# Patient Record
(demographics unavailable — no encounter records)

---

## 2025-01-24 NOTE — ASSESSMENT
[FreeTextEntry1] : 87F with PMH of hypothyroidism, migraines, JUANITA, pulmonary nodule, HTN, HLD, Afib (currently off Eliquis), HFpEF, and severe MR 2/2 flail P2 leaflet with 2 ADHF admission in December in the setting of severe MR now s/p mTEER (XTW x 1) via RFV on 12/31/24, who presents for her post hospital discharge follow up.   The patient is clinically stable, NYHA Class II. The patient reports to continue to feel well, no concerning symptoms of volume overload, and access sites are healing appropriately. Saw her cardiologist yesterday who recommends resuming her Eliquis, awaiting delivery of the medication. The ECHO done prior to discharge was reviewed. Recommend continue close BP monitoring, asked to purchase a scale to monitor her daily weight and to reach out to her cardiologist should she gain 2lbs in 1 day or 5 lbs. in 1 week to prevent hospital readmission. Maintain low sodium (2G) Na diet. Reminded patient to avoid routine dental visit/procedure for the next 3 months and that IE Abx ppx prior to any dental procedure is needed indefinitely moving forward. The patient will return in three weeks with ECHO for her 1-month post mTEER follow up. All questions were answered.

## 2025-01-24 NOTE — PLAN
[TextEntry] : - continue current medication regimen, agree with restarting Eliquis.  - continue close follow up with Dr. Valle for ongoing cardiology care and GDMT optimization.  - IE Abx ppx 30-60 min prior to dental procedure.  - maintain 2G Na restricted diet, daily weight, and symptoms to reports were provided.  - return to [facility] in 3-week with TTE and an outpatient visit for her 1-month post mTEER follow up, or as needed.

## 2025-01-24 NOTE — HISTORY OF PRESENT ILLNESS
[FreeTextEntry1] : Cardiologist: Dr. Valle from [facility]  87F with PMH of hypothyroidism, migraines, JUANITA, pulmonary nodule, HTN, HLD, Afib (currently off Eliquis), HFpEF, and severe MR 2/2 flail P2 leaflet with 2 ADHF admission in December in the setting of severe MR now s/p mTEER (XTW x 1) via RFV on 12/31/24, who presents for her post hospital discharge follow up.   Discharge Summary [facility] Admission from 12/17/24-1/6/2025 Had hospitalization 12/9-12/12/24 at [facility] for AHRF 2/2 to acute on chronic CHF exacerbation and Afib w/ RVR, then represented to [facility] 12/17/24 with worsening ARROYO. Patient transferred to [facility] for HFpEF exacerbation with severe MR who underwent Mitraclip on 12/31/24 with Dr. Ang. OR course significant for trace blood in the mouth after TONI probe/ET tube removed. No obvious signs of trauma. Arrived to  under ICU care, stable in afib, rate controlled. POD 1 Overnight with expanding hematoma sublingual. ENT consulted and recommended decadron to help reduce size. POD 3 speech/swallow OK to advance diet as patient tolerates. Patient refused ASA 2/2 to "stomach burning", understands she is at increased risk for blood clot. POD 4: started on medrol dosepak, per ENT recommendations, cleared to resume Eliquis on 01/2/25. POD 6 Per Dr. Ang, continue to hold Eliquis until outpatient follow-up. Home health reinstated for today. Cleared for discharge per Dr. Ang. At time of discharge, she is hemodynamically stable, voiding well, passing gas, ambulating in the hallway, and pain controlled under oral regimen.   Discharge TTE 12/31/24 CONCLUSIONS: 1. Technically difficult study. 2. Normal left ventricular size and systolic function. 3. Moderate symmetric left ventricular hypertrophy. 4. Normal right ventricular size and systolic function. 5. Mild aortic stenosis. Mild aortic regurgitation. 6. One XTW MitraClip visualized in stable position at A2-P2 position. Mean transvalvular gradient is 4.00 mmHg at a heart rate of 93 bpm. 7. Mild-to-moderate eccentric mitral regurgitation predominantly lateral to MitraClip at blood pressure of 140/87. 8. Mild tricuspid regurgitation. 9. Pulmonary hypertension present, pulmonary artery systolic pressure is 41 mmHg. 10. No pericardial effusion. 11. Compared to the previous TTE performed on 12/19/2024, patient is s/p mTEER with improvement in severity of mitral regurgitation.   Discharge Labs 1/4/25 WBC 8.07 H/H 12/37.9 Plt 262 BUN/Cr 20/0.77   Discharge Medications: Atorvastatin 10mg daily, digoxin 125mcg daily, Entresto 24-26mg BID, Lasix 20mg daily, Jardiance 10 mg daily, levothyroxine 50mcg daily, medrol dose sanford, Toprol XL 200mg daily, MVI and Pantoprazole.    Home Weight Trend: Home BP trend: SBP  100-110s   Upcoming follow up appointments: Dr. Valle from [facility] - saw yesterday (1/23/25), who recommended resuming her Eliquis.   Today, patient reports feeling better, energy is better overall - feels tired today because she was out of the house for her doctor's appointment for 7hrs yesterday. She is more stable on her feet, walks around the house without shortness of breath, access sites are healing well. Completed her medrol dosepak, no oral pain or residual edema, eating and speaking well. Denies any recent chest pain, shortness of breath, palpitations, lightheadedness/dizziness or syncope, orthopnea, PND, LE edema, abdominal bloating, fever, chills, night sweats, or dysuria. She lives with a 24hr A.

## 2025-01-24 NOTE — HISTORY OF PRESENT ILLNESS
[FreeTextEntry1] : Cardiologist: Dr. Valle from [facility]  87F with PMH of hypothyroidism, migraines, JUANITA, pulmonary nodule, HTN, HLD, Afib (currently off Eliquis), HFpEF, and severe MR 2/2 flail P2 leaflet with 2 ADHF admission in December in the setting of severe MR now s/p mTEER (XTW x 1) via RFV on 12/31/24, who presents for her post hospital discharge follow up.   Discharge Summary [facility] Admission from 12/17/24-1/6/2025 Had hospitalization 12/9-12/12/24 at [facility] for AHRF 2/2 to acute on chronic CHF exacerbation and Afib w/ RVR, then represented to [facility] 12/17/24 with worsening ARROYO. Patient transferred to [facility] for HFpEF exacerbation with severe MR who underwent Mitraclip on 12/31/24 with Dr. Ang. OR course significant for trace blood in the mouth after TONI probe/ET tube removed. No obvious signs of trauma. Arrived to  under ICU care, stable in afib, rate controlled. POD 1 Overnight with expanding hematoma sublingual. ENT consulted and recommended decadron to help reduce size. POD 3 speech/swallow OK to advance diet as patient tolerates. Patient refused ASA 2/2 to "stomach burning", understands she is at increased risk for blood clot. POD 4: started on medrol dosepak, per ENT recommendations, cleared to resume Eliquis on 01/2/25. POD 6 Per Dr. Ang, continue to hold Eliquis until outpatient follow-up. Home health reinstated for today. Cleared for discharge per Dr. Ang. At time of discharge, she is hemodynamically stable, voiding well, passing gas, ambulating in the hallway, and pain controlled under oral regimen.   Discharge TTE 12/31/24 CONCLUSIONS: 1. Technically difficult study. 2. Normal left ventricular size and systolic function. 3. Moderate symmetric left ventricular hypertrophy. 4. Normal right ventricular size and systolic function. 5. Mild aortic stenosis. Mild aortic regurgitation. 6. One XTW MitraClip visualized in stable position at A2-P2 position. Mean transvalvular gradient is 4.00 mmHg at a heart rate of 93 bpm. 7. Mild-to-moderate eccentric mitral regurgitation predominantly lateral to MitraClip at blood pressure of 140/87. 8. Mild tricuspid regurgitation. 9. Pulmonary hypertension present, pulmonary artery systolic pressure is 41 mmHg. 10. No pericardial effusion. 11. Compared to the previous TTE performed on 12/19/2024, patient is s/p mTEER with improvement in severity of mitral regurgitation.   Discharge Labs 1/4/25 WBC 8.07 H/H 12/37.9 Plt 262 BUN/Cr 20/0.77   Discharge Medications: Atorvastatin 10mg daily, digoxin 125mcg daily, Entresto 24-26mg BID, Lasix 20mg daily, Jardiance 10 mg daily, levothyroxine 50mcg daily, medrol dose sanford, Toprol XL 200mg daily, MVI and Pantoprazole.    Home Weight Trend: Home BP trend: SBP  100-110s   Upcoming follow up appointments: Dr. Valle from [facility] - saw yesterday (1/23/25), who recommended resuming her Eliquis.   Today, patient reports feeling better, energy is better overall - feels tired today because she was out of the house for her doctor's appointment for 7hrs yesterday. She is more stable on her feet, walks around the house without shortness of breath, access sites are healing well. Completed her medrol dosepak, no oral pain or residual edema, eating and speaking well. Denies any recent chest pain, shortness of breath, palpitations, lightheadedness/dizziness or syncope, orthopnea, PND, LE edema, abdominal bloating, fever, chills, night sweats, or dysuria. She lives with a 24hr A.

## 2025-03-04 NOTE — HISTORY OF PRESENT ILLNESS
[FreeTextEntry1] : Cardiologist: Dr. Valle from [facility]  87F with PMH of hypothyroidism, migraines, JUANITA, pulmonary nodule, HTN, HLD, Afib (currently off Eliquis), HFpEF, and severe MR 2/2 flail P2 leaflet with 2 ADHF admission in December in the setting of severe MR now s/p mTEER (XTW x 1) via RFV on 12/31/24, who presents for her one month follow up.   Discharge Summary [facility] Admission from 12/17/24-1/6/2025 Had hospitalization 12/9-12/12/24 at [facility] for AHRF 2/2 to acute on chronic CHF exacerbation and Afib w/ RVR, then represented to [facility] 12/17/24 with worsening ARROYO. Patient transferred to [facility] for HFpEF exacerbation with severe MR who underwent Mitraclip on 12/31/24 with Dr. Ang. OR course significant for trace blood in the mouth after TONI probe/ET tube removed. No obvious signs of trauma. Arrived to  under ICU care, stable in afib, rate controlled. POD 1 Overnight with expanding hematoma sublingual. ENT consulted and recommended decadron to help reduce size. POD 3 speech/swallow OK to advance diet as patient tolerates. Patient refused ASA 2/2 to "stomach burning", understands she is at increased risk for blood clot. POD 4: started on medrol dosepak, per ENT recommendations, cleared to resume Eliquis on 01/2/25. POD 6 Per Dr. Ang, continue to hold Eliquis until outpatient follow-up. Home health reinstated for today. Cleared for discharge per Dr. Ang. At time of discharge, she is hemodynamically stable, voiding well, passing gas, ambulating in the hallway, and pain controlled under oral regimen.   Discharge TTE 12/31/24 CONCLUSIONS: 1. Technically difficult study. 2. Normal left ventricular size and systolic function. 3. Moderate symmetric left ventricular hypertrophy. 4. Normal right ventricular size and systolic function. 5. Mild aortic stenosis. Mild aortic regurgitation. 6. One XTW MitraClip visualized in stable position at A2-P2 position. Mean transvalvular gradient is 4.00 mmHg at a heart rate of 93 bpm. 7. Mild-to-moderate eccentric mitral regurgitation predominantly lateral to MitraClip at blood pressure of 140/87. 8. Mild tricuspid regurgitation. 9. Pulmonary hypertension present, pulmonary artery systolic pressure is 41 mmHg. 10. No pericardial effusion. 11. Compared to the previous TTE performed on 12/19/2024, patient is s/p mTEER with improvement in severity of mitral regurgitation.   Discharge Labs 1/4/25 WBC 8.07 H/H 12/37.9 Plt 262 BUN/Cr 20/0.77   Discharge Medications: Atorvastatin 10mg daily, digoxin 125mcg daily, Entresto 24-26mg BID, Lasix 20mg daily, Jardiance 10 mg daily, levothyroxine 50mcg daily, medrol dose sanford, Toprol XL 200mg daily, MVI and Pantoprazole.    Home Weight Trend: Home BP trend: SBP  100-110s   Upcoming follow up appointments: Dr. Valle from [facility] - saw 1/23/25 who recommended resuming her Eliquis.   Today, patient reports she is feeling well overall.  She states she is in a much better state of health than prior to her procedure.  She is walking in her home without assistance with no limitations.  She denies chest pain, shortness of breath at rest or with exertion, dizziness, syncope, orthopnea, PND, or LE edema.    She lives with a 24hr HHA. The patient has an ECHO scheduled for today.

## 2025-03-04 NOTE — HISTORY OF PRESENT ILLNESS
[FreeTextEntry1] : Cardiologist: Dr. Valle from [facility]  87F with PMH of hypothyroidism, migraines, JUANITA, pulmonary nodule, HTN, HLD, Afib (currently off Eliquis), HFpEF, and severe MR 2/2 flail P2 leaflet with 2 ADHF admission in December in the setting of severe MR now s/p mTEER (XTW x 1) via RFV on 12/31/24, who presents for her one month follow up.   Discharge Summary [facility] Admission from 12/17/24-1/6/2025 Had hospitalization 12/9-12/12/24 at [facility] for AHRF 2/2 to acute on chronic CHF exacerbation and Afib w/ RVR, then represented to [facility] 12/17/24 with worsening ARROYO. Patient transferred to [facility] for HFpEF exacerbation with severe MR who underwent Mitraclip on 12/31/24 with Dr. Ang. OR course significant for trace blood in the mouth after TONI probe/ET tube removed. No obvious signs of trauma. Arrived to  under ICU care, stable in afib, rate controlled. POD 1 Overnight with expanding hematoma sublingual. ENT consulted and recommended decadron to help reduce size. POD 3 speech/swallow OK to advance diet as patient tolerates. Patient refused ASA 2/2 to "stomach burning", understands she is at increased risk for blood clot. POD 4: started on medrol dosepak, per ENT recommendations, cleared to resume Eliquis on 01/2/25. POD 6 Per Dr. Ang, continue to hold Eliquis until outpatient follow-up. Home health reinstated for today. Cleared for discharge per Dr. Ang. At time of discharge, she is hemodynamically stable, voiding well, passing gas, ambulating in the hallway, and pain controlled under oral regimen.   Discharge TTE 12/31/24 CONCLUSIONS: 1. Technically difficult study. 2. Normal left ventricular size and systolic function. 3. Moderate symmetric left ventricular hypertrophy. 4. Normal right ventricular size and systolic function. 5. Mild aortic stenosis. Mild aortic regurgitation. 6. One XTW MitraClip visualized in stable position at A2-P2 position. Mean transvalvular gradient is 4.00 mmHg at a heart rate of 93 bpm. 7. Mild-to-moderate eccentric mitral regurgitation predominantly lateral to MitraClip at blood pressure of 140/87. 8. Mild tricuspid regurgitation. 9. Pulmonary hypertension present, pulmonary artery systolic pressure is 41 mmHg. 10. No pericardial effusion. 11. Compared to the previous TTE performed on 12/19/2024, patient is s/p mTEER with improvement in severity of mitral regurgitation.   Discharge Labs 1/4/25 WBC 8.07 H/H 12/37.9 Plt 262 BUN/Cr 20/0.77   Discharge Medications: Atorvastatin 10mg daily, digoxin 125mcg daily, Entresto 24-26mg BID, Lasix 20mg daily, Jardiance 10 mg daily, levothyroxine 50mcg daily, medrol dose sanford, Toprol XL 200mg daily, MVI and Pantoprazole.    Home Weight Trend: Home BP trend: SBP  100-110s   Upcoming follow up appointments: Dr. Valle from [facility] - saw 1/23/25 who recommended resuming her Eliquis.   Today, patient reports she is feeling well overall.  She states she is in a much better state of health than prior to her procedure.  She is walking in her home without assistance with no limitations.  She denies chest pain, shortness of breath at rest or with exertion, dizziness, syncope, orthopnea, PND, or LE edema.    She lives with a 24hr HHA. The patient has an ECHO scheduled for today.

## 2025-03-04 NOTE — ASSESSMENT
[FreeTextEntry1] : 87F with PMH of hypothyroidism, migraines, JUANITA, pulmonary nodule, HTN, HLD, Afib (currently off Eliquis), HFpEF, and severe MR 2/2 flail P2 leaflet with 2 ADHF admission in December in the setting of severe MR now s/p mTEER (XTW x 1) via RFV on 12/31/24, who presents for her one month follow up.  The patient is clinically stable; NYHA Class I.  The ECHO was independently reviewed by Dr. Ang which shows residual moderate mitral regurgitation with a jet arising from the lateral orifice.  The results were discussed with the patient.  At this time, the patient continues to feel well with no signs or symptoms of heart failure.  The patient remains active in her home with no limitations.  Dr. Ang recommends close clinical follow up and to return to [facility] in 3 month via TEB for clinical reassessment.  If the patient becomes symptomatic, the patient would be a candidate for another procedure to place an additional clip on the valve to reduce the leak further.  This was discussed with the patient and she verbalized understanding.  All questions answered.

## 2025-03-04 NOTE — RESULTS/DATA
[TextEntry] :   ECHO 3/3/2025:  CONCLUSIONS:  1. Left ventricular cavity is normal in size. Left ventricular systolic function is normal with an ejection fraction of 64 % by Hernandez's method of disks.  2. Normal right ventricular cavity size and normal right ventricular systolic function.  3. Mild aortic stenosis.  4. Percutaneous [id number] mitral repair device with a Mitraclip. The transmitral mean gradient is 5.31 mmHg at a heart rate of 79 bpm.  5. Moderate mitral regurgitation. The predominant jet appears to arise from the lateral orifice.  6. Mild pulmonary hypertension.  7. No pericardial effusion seen.  8. Compared to the transthoracic echocardiogram performed on 12/31/2024, mitral regurgitation grade is higher.

## 2025-03-04 NOTE — HISTORY OF PRESENT ILLNESS
[FreeTextEntry1] : Cardiologist: Dr. Valle from [facility]  87F with PMH of hypothyroidism, migraines, JUANITA, pulmonary nodule, HTN, HLD, Afib (currently off Eliquis), HFpEF, and severe MR 2/2 flail P2 leaflet with 2 ADHF admission in December in the setting of severe MR now s/p mTEER (XTW x 1) via RFV on 12/31/24, who presents for her one month follow up.   Discharge Summary [facility] Admission from 12/17/24-1/6/2025 Had hospitalization 12/9-12/12/24 at [facility] for AHRF 2/2 to acute on chronic CHF exacerbation and Afib w/ RVR, then represented to [facility] 12/17/24 with worsening ARROYO. Patient transferred to [facility] for HFpEF exacerbation with severe MR who underwent Mitraclip on 12/31/24 with Dr. Ang. OR course significant for trace blood in the mouth after TONI probe/ET tube removed. No obvious signs of trauma. Arrived to  under ICU care, stable in afib, rate controlled. POD 1 Overnight with expanding hematoma sublingual. ENT consulted and recommended decadron to help reduce size. POD 3 speech/swallow OK to advance diet as patient tolerates. Patient refused ASA 2/2 to "stomach burning", understands she is at increased risk for blood clot. POD 4: started on medrol dosepak, per ENT recommendations, cleared to resume Eliquis on 01/2/25. POD 6 Per Dr. Ang, continue to hold Eliquis until outpatient follow-up. Home health reinstated for today. Cleared for discharge per Dr. Ang. At time of discharge, she is hemodynamically stable, voiding well, passing gas, ambulating in the hallway, and pain controlled under oral regimen.   Discharge TTE 12/31/24 CONCLUSIONS: 1. Technically difficult study. 2. Normal left ventricular size and systolic function. 3. Moderate symmetric left ventricular hypertrophy. 4. Normal right ventricular size and systolic function. 5. Mild aortic stenosis. Mild aortic regurgitation. 6. One XTW MitraClip visualized in stable position at A2-P2 position. Mean transvalvular gradient is 4.00 mmHg at a heart rate of 93 bpm. 7. Mild-to-moderate eccentric mitral regurgitation predominantly lateral to MitraClip at blood pressure of 140/87. 8. Mild tricuspid regurgitation. 9. Pulmonary hypertension present, pulmonary artery systolic pressure is 41 mmHg. 10. No pericardial effusion. 11. Compared to the previous TTE performed on 12/19/2024, patient is s/p mTEER with improvement in severity of mitral regurgitation.   Discharge Labs 1/4/25 WBC 8.07 H/H 12/37.9 Plt 262 BUN/Cr 20/0.77   Discharge Medications: Atorvastatin 10mg daily, digoxin 125mcg daily, Entresto 24-26mg BID, Lasix 20mg daily, Jardiance 10 mg daily, levothyroxine 50mcg daily, medrol dose sanford, Toprol XL 200mg daily, MVI and Pantoprazole.    Home Weight Trend: Home BP trend: SBP  100-110s   Upcoming follow up appointments: Dr. Valle from [facility] - saw 1/23/25 who recommended resuming her Eliquis.   Today, patient reports she is feeling well overall.  She states she is in a much better state of health than prior to her procedure.  She is walking in her home without assistance with no limitations.  She denies chest pain, shortness of breath at rest or with exertion, dizziness, syncope, orthopnea, PND, or LE edema.    She lives with a 24hr HHA. The patient has an ECHO scheduled for today.

## 2025-03-04 NOTE — ASSESSMENT
[FreeTextEntry1] : 87F with PMH of hypothyroidism, migraines, JUANITA, pulmonary nodule, HTN, HLD, Afib (currently off Eliquis), HFpEF, and severe MR 2/2 flail P2 leaflet with 2 ADHF admission in December in the setting of severe MR now s/p mTEER (XTW x 1) via RFV on 12/31/24, who presents for her one month follow up.  The patient is clinically stable; NYHA Class I.  The ECHO was independently reviewed by Dr. Ang which shows residual moderate mitral regurgitation with a jet arising from the lateral orifice.  The results were discussed with the patient.  At this time, the patient continues to feel well with no signs or symptoms of heart failure.  The patient remains active in her home with no limitations.  Dr. Ang recommends close clinical follow up and to return to [facility] in 3 month via TEB for clinical reassessment.  If the patient becomes symptomatic, the patient would be a candidate for another procedure to place an additional clip on the valve to reduce the leak further.  This was discussed with the patient and she verbalized understanding.  All questions answered.

## 2025-03-04 NOTE — PLAN
[TextEntry] :  (1) Return to [facility] in 3 months via TEB (2) Return to [facility] in 6 months in person (3) Contact [facility] sooner if symptoms arise; will discuss additional clip at that time if needed (4) Continue cardiology care and medication management with Dr. Valle

## 2025-03-04 NOTE — END OF VISIT
[FreeTextEntry3] : I, Barbara Spears, am scribing for Dr. Orlin Ang the following sections: HISTORY OF PRESENT ILLNESS, PAST MEDICAL/FAMILY/SOCIAL HISTORY, REVIEW OF SYSTEMS, VITAL SIGNS, PHYSICAL EXAM AND DISPOSITION.   I personally performed the services described in the documentation and reviewed the documented recorded by the scribe in my presence; it accurately and completely records my words and actions.

## 2025-03-04 NOTE — ASSESSMENT
[FreeTextEntry1] : 87F with PMH of hypothyroidism, migraines, JUANITA, pulmonary nodule, HTN, HLD, Afib (currently off Eliquis), HFpEF, and severe MR 2/2 flail P2 leaflet with 2 ADHF admission in December in the setting of severe MR now s/p mTEER (XTW x 1) via RFV on 12/31/24, who presents for her one month follow up.  The patient is clinically stable; NYHA Class I.  The ECHO was independently reviewed by Dr. Ang which shows residual moderate mitral regurgitation with a jet arising from the lateral orifice.  The results were discussed with the patient.  At this time, the patient continues to feel well with no signs or symptoms of heart failure.  The patient remains active in her home with no limitations.  Dr. Ang recommends close clinical follow up and to return to [facility] in 3 month via TEB for clinical reassessment.  If the patient becomes symptomatic, the patient would be a candidate for another procedure to place an additional clip on the valve to reduce the leak further.  This was discussed with the patient and she verbalized understanding.  All questions answered.

## 2025-03-04 NOTE — RESULTS/DATA
[TextEntry] :   ECHO 3/3/2025:  CONCLUSIONS:  1. Left ventricular cavity is normal in size. Left ventricular systolic function is normal with an ejection fraction of 64 % by Hernandez's method of disks.  2. Normal right ventricular cavity size and normal right ventricular systolic function.  3. Mild aortic stenosis.  4. Percutaneous lyfs-eg-nquj mitral repair device with a Mitraclip. The transmitral mean gradient is 5.31 mmHg at a heart rate of 79 bpm.  5. Moderate mitral regurgitation. The predominant jet appears to arise from the lateral orifice.  6. Mild pulmonary hypertension.  7. No pericardial effusion seen.  8. Compared to the transthoracic echocardiogram performed on 12/31/2024, mitral regurgitation grade is higher.